# Patient Record
Sex: MALE | Race: WHITE
[De-identification: names, ages, dates, MRNs, and addresses within clinical notes are randomized per-mention and may not be internally consistent; named-entity substitution may affect disease eponyms.]

---

## 2018-02-01 ENCOUNTER — HOSPITAL ENCOUNTER (OUTPATIENT)
Dept: HOSPITAL 9 - MADRAD | Age: 73
Discharge: HOME | End: 2018-02-01
Attending: FAMILY MEDICINE
Payer: MEDICARE

## 2018-02-01 DIAGNOSIS — M25.511: Primary | ICD-10-CM

## 2018-02-01 DIAGNOSIS — M19.011: ICD-10-CM

## 2018-02-01 NOTE — RAD
RIGHT SHOULDER THREE VIEWS:

 

HISTORY:

Fall two days ago with shoulder pain.

 

COMPARISON:

None.

 

FINDINGS:

No acute displaced fracture or malalignment.  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
t with ossification along the superior acromioclavicular ligament.  The ribs are intact.

 

IMPRESSION:

Degenerative changes.  No acute abnormality.

 

POS: OFF

## 2018-08-08 ENCOUNTER — HOSPITAL ENCOUNTER (EMERGENCY)
Dept: HOSPITAL 9 - MADERS | Age: 73
End: 2018-08-08
Payer: MEDICARE

## 2018-08-08 DIAGNOSIS — I46.9: Primary | ICD-10-CM

## 2018-08-08 DIAGNOSIS — I10: ICD-10-CM

## 2018-08-08 DIAGNOSIS — I49.01: ICD-10-CM

## 2018-08-08 LAB
ALBUMIN SERPL BCG-MCNC: 3.6 G/DL (ref 3.4–4.8)
ALP SERPL-CCNC: 93 U/L (ref 40–150)
ALT SERPL W P-5'-P-CCNC: 75 U/L (ref 8–55)
ANION GAP SERPL CALC-SCNC: 27 MMOL/L (ref 10–20)
AST SERPL-CCNC: 100 U/L (ref 5–34)
BASOPHILS # BLD AUTO: 0.1 THOU/UL (ref 0–0.2)
BASOPHILS NFR BLD AUTO: 1.2 % (ref 0–1)
BILIRUB SERPL-MCNC: 0.7 MG/DL (ref 0.2–1.2)
BUN SERPL-MCNC: 26 MG/DL (ref 8.4–25.7)
CALCIUM SERPL-MCNC: 9.6 MG/DL (ref 7.8–10.44)
CHLORIDE SERPL-SCNC: 104 MMOL/L (ref 98–107)
CK MB SERPL-MCNC: 3.2 NG/ML (ref 0–6.6)
CO2 SERPL-SCNC: 19 MMOL/L (ref 23–31)
CREAT CL PREDICTED SERPL C-G-VRATE: 0 ML/MIN (ref 70–130)
EOSINOPHIL # BLD AUTO: 0.3 THOU/UL (ref 0–0.7)
EOSINOPHIL NFR BLD AUTO: 2.4 % (ref 0–10)
GLOBULIN SER CALC-MCNC: 3 G/DL (ref 2.4–3.5)
GLUCOSE SERPL-MCNC: 306 MG/DL (ref 83–110)
HGB BLD-MCNC: 14.5 G/DL (ref 14–18)
LYMPHOCYTES # BLD AUTO: 5.2 THOU/UL (ref 1.2–3.4)
LYMPHOCYTES NFR BLD AUTO: 40.8 % (ref 21–51)
MCH RBC QN AUTO: 30.8 PG (ref 27–31)
MCV RBC AUTO: 102 FL (ref 78–98)
MONOCYTES # BLD AUTO: 1.1 THOU/UL (ref 0.11–0.59)
MONOCYTES NFR BLD AUTO: 8.9 % (ref 0–10)
NEUTROPHILS # BLD AUTO: 5.9 THOU/UL (ref 1.4–6.5)
NEUTROPHILS NFR BLD AUTO: 46.6 % (ref 42–75)
PLATELET # BLD AUTO: 183 THOU/UL (ref 130–400)
POTASSIUM SERPL-SCNC: 3.9 MMOL/L (ref 3.5–5.1)
RBC # BLD AUTO: 4.7 MILL/UL (ref 4.7–6.1)
SODIUM SERPL-SCNC: 146 MMOL/L (ref 136–145)
TROPONIN I SERPL DL<=0.01 NG/ML-MCNC: 0.08 NG/ML (ref ?–0.03)
WBC # BLD AUTO: 12.7 THOU/UL (ref 4.8–10.8)

## 2018-08-08 PROCEDURE — 82553 CREATINE MB FRACTION: CPT

## 2018-08-08 PROCEDURE — 92950 HEART/LUNG RESUSCITATION CPR: CPT

## 2018-08-08 PROCEDURE — 80053 COMPREHEN METABOLIC PANEL: CPT

## 2018-08-08 PROCEDURE — 84484 ASSAY OF TROPONIN QUANT: CPT

## 2018-08-08 PROCEDURE — 85025 COMPLETE CBC W/AUTO DIFF WBC: CPT

## 2018-08-08 PROCEDURE — 96360 HYDRATION IV INFUSION INIT: CPT
